# Patient Record
Sex: FEMALE | Race: WHITE | NOT HISPANIC OR LATINO | Employment: OTHER | URBAN - METROPOLITAN AREA
[De-identification: names, ages, dates, MRNs, and addresses within clinical notes are randomized per-mention and may not be internally consistent; named-entity substitution may affect disease eponyms.]

---

## 2017-04-10 ENCOUNTER — TRANSCRIBE ORDERS (OUTPATIENT)
Dept: ADMINISTRATIVE | Facility: HOSPITAL | Age: 72
End: 2017-04-10

## 2017-04-10 DIAGNOSIS — Z12.31 ENCOUNTER FOR SCREENING MAMMOGRAM FOR BREAST CANCER: Primary | ICD-10-CM

## 2017-04-11 ENCOUNTER — HOSPITAL ENCOUNTER (OUTPATIENT)
Dept: MAMMOGRAPHY | Facility: HOSPITAL | Age: 72
Discharge: HOME | End: 2017-04-11
Attending: INTERNAL MEDICINE | Admitting: INTERNAL MEDICINE

## 2017-04-11 DIAGNOSIS — Z12.31 ENCOUNTER FOR SCREENING MAMMOGRAM FOR BREAST CANCER: ICD-10-CM

## 2017-04-11 PROCEDURE — G0202 SCR MAMMO BI INCL CAD: HCPCS

## 2017-04-11 PROCEDURE — 77063 BREAST TOMOSYNTHESIS BI: CPT

## 2017-04-17 ENCOUNTER — TRANSCRIBE ORDERS (OUTPATIENT)
Dept: ADMINISTRATIVE | Facility: HOSPITAL | Age: 72
End: 2017-04-17

## 2017-04-17 DIAGNOSIS — C80.1 MALIGNANT NEOPLASM (HCC): Primary | ICD-10-CM

## 2018-04-12 ENCOUNTER — HOSPITAL ENCOUNTER (OUTPATIENT)
Dept: MAMMOGRAPHY | Facility: HOSPITAL | Age: 73
Discharge: HOME | End: 2018-04-12
Attending: INTERNAL MEDICINE | Admitting: INTERNAL MEDICINE

## 2018-04-12 DIAGNOSIS — C80.1 MALIGNANT NEOPLASM (HCC): ICD-10-CM

## 2018-04-12 PROCEDURE — 77063 BREAST TOMOSYNTHESIS BI: CPT

## 2018-04-12 PROCEDURE — 77067 SCR MAMMO BI INCL CAD: CPT

## 2019-03-05 ENCOUNTER — TRANSCRIBE ORDERS (OUTPATIENT)
Dept: ADMINISTRATIVE | Facility: HOSPITAL | Age: 74
End: 2019-03-05

## 2019-03-05 DIAGNOSIS — Z12.39 BREAST SCREENING: Primary | ICD-10-CM

## 2019-04-15 ENCOUNTER — HOSPITAL ENCOUNTER (OUTPATIENT)
Dept: MAMMOGRAPHY | Facility: HOSPITAL | Age: 74
Discharge: HOME | End: 2019-04-15
Admitting: INTERNAL MEDICINE

## 2019-04-15 DIAGNOSIS — Z12.39 BREAST SCREENING: ICD-10-CM

## 2019-04-15 PROCEDURE — 77067 SCR MAMMO BI INCL CAD: CPT

## 2019-04-15 PROCEDURE — 77063 BREAST TOMOSYNTHESIS BI: CPT

## 2019-04-22 ENCOUNTER — TRANSCRIBE ORDERS (OUTPATIENT)
Dept: ADMINISTRATIVE | Facility: HOSPITAL | Age: 74
End: 2019-04-22

## 2019-04-22 DIAGNOSIS — Z12.39 SCREENING BREAST EXAMINATION: ICD-10-CM

## 2019-04-22 DIAGNOSIS — Z79.810 LONG TERM (CURRENT) USE OF SELECTIVE ESTROGEN RECEPTOR MODULATORS (SERMS): Primary | ICD-10-CM

## 2019-05-01 ENCOUNTER — HOSPITAL ENCOUNTER (OUTPATIENT)
Dept: PHYSICAL THERAPY | Facility: HOSPITAL | Age: 74
Setting detail: THERAPIES SERIES
Discharge: HOME | End: 2019-05-01

## 2019-05-01 DIAGNOSIS — M47.27 LUMBOSACRAL SPONDYLOSIS WITH RADICULOPATHY: Primary | ICD-10-CM

## 2019-05-01 PROCEDURE — 97161 PT EVAL LOW COMPLEX 20 MIN: CPT | Performed by: PHYSICAL THERAPIST

## 2019-05-01 PROCEDURE — 97110 THERAPEUTIC EXERCISES: CPT | Performed by: PHYSICAL THERAPIST

## 2019-05-01 NOTE — THERAPY EVALUATION
Outpatient Physical Therapy Ortho Initial Evaluation  [facility]     Patient Name: Codi Castillo  : 1945  MRN: [id number]  Today's Date: 2019      Visit Date: 2019    There is no problem list on file for this patient.       Past Medical History:   Diagnosis Date   • Breast cancer (CMS/[facility]) 2010    right mastectomy        Past Surgical History:   Procedure Laterality Date   • HYSTERECTOMY     • MASTECTOMY         Visit Dx:     ICD-10-CM ICD-9-CM   1. Lumbosacral spondylosis with radiculopathy M47.27 721.3     724.4         Patient History     Row Name 19 0800             History    Chief Complaint  Pain;Difficulty with daily activities  -SP      Type of Pain  Back pain  -SP      Date Current Problem(s) Began  19  -SP      Brief Description of Current Complaint  Patient reports insidious onset low back pain that began several months ago.  She has had 2 injections, she had two weeks of relief following inital injection but no relief following last injection.  Patient reports that she had prior treatment including PT at [facility] during winter.  She reports that she had increased pain and symptoms with therapy and it did not help.  She states she had been doing exercise only and all symptoms worsened.  Patient states she is not taking any meds for her back.  Patient reports that she is having symptoms into right LE that are above knee, most noticeable with walking or standing.  She has decreased pain with sitting or lying.    -SP      Previous treatment for THIS PROBLEM  Injections;Rehabilitation  -SP      Patient/Caregiver Goals  Relieve pain  -SP      Current Tobacco Use  no  -SP      Patient's Rating of General Health  Good  -SP      Occupation/sports/leisure activities  enjoys sewing, stands to do so  -SP      How has patient tried to help current problem?  sit, rest  -SP      What clinical tests have you had for this problem?  MRI  -SP      Results of Clinical Tests   patient does not know results; results not available on EPIC  -SP         Pain     Pain Location  Back;Leg  -SP      Pain at Present  0  -SP      Pain at Best  0  -SP      Pain at Worst  10  -SP      Pain Frequency  Intermittent  -SP      Pain Description  Sharp  -SP      What Performance Factors Make the Current Problem(s) WORSE?  standing walking  -SP      What Performance Factors Make the Current Problem(s) BETTER?  sitting, lying  -SP      Tolerance Time- Standing  10 min  -SP      Tolerance Time- Sitting  unlimited  -SP      Tolerance Time- Walking  10 min  -SP      Is your sleep disturbed?  No  -SP      Difficulties at work?  retired  -SP      Difficulties with ADL's?  difficulty tolerating going to grocery, cooking, cleaning  -SP         Fall Risk Assessment    Any falls in the past year:  No  -SP         Daily Activities    Primary Language  English  -SP      Are you able to read  Yes  -SP      Are you able to write  Yes  -SP      How does patient learn best?  Listening;Reading  -SP      Teaching needs identified  Home Exercise Program;Management of Condition  -SP      Does patient have problems with the following?  None  -SP      Barriers to learning  None  -SP      Pt Participated in POC and Goals  Yes  -SP         Safety    Are you being hurt, hit, or frightened by anyone at home or in your life?  No  -SP      Are you being neglected by a caregiver  No  -SP        User Key  (r) = Recorded By, (t) = Taken By, (c) = Cosigned By    Initials Name Provider Type    SP Andria Benavides, PT Physical Therapist          PT Ortho     Row Name 05/01/19 0900       Posture/Observations    Thoracic Kyphosis  Mild right thoracic shift  -SP    Lumbar lordosis  Decreased  -SP    Iliac crests  Bilateral:;Normal  -SP       Neural Tension Signs- Lower Quarter Clearing    SLR  Bilateral:;Negative  -SP       Sensory Screen for Light Touch- Lower Quarter Clearing    L1 (inguinal area)  Bilateral:;Intact  -SP    L2  (anterior mid thigh)  Bilateral:;Intact  -SP    L3 (distal anterior thigh)  Bilateral:;Intact  -SP    L4 (medial lower leg/foot)  Bilateral:;Intact  -SP    L5 (lateral lower leg/great toe)  Bilateral:;Intact  -SP    S1 (bottom of foot)  Bilateral:;Intact  -SP       Myotomal Screen- Lower Quarter Clearing    Hip flexion (L2)  Bilateral:;4+ (Good +)  -SP    Knee extension (L3)  Bilateral:;4+ (Good +)  -SP    Ankle DF (L4)  Bilateral:;4- (Good -)  -SP    Great toe extension (L5)  Bilateral:;4- (Good -)  -SP    Ankle PF (S1)  Bilateral:;4- (Good -)  -SP    Knee flexion (S2)  Bilateral:;4 (Good)  -SP       Lumbar/SI Special Tests    Standing Flexion Test (SI Dysfunction)  Bilateral:;Negative  -SP    Stork Test (SI Dysfunction)  Bilateral:;Negative  -SP    SLR (Neural Tension)  Bilateral:;Negative  -SP    NITIN (hip vs. SI Dysfunction)  Right:;Positive  -SP       Lumbosacral Palpation    SI  Bilateral:;Tender  -SP    Lumbosacral Segment  Bilateral:;Tender;Guarded/taut  -SP    Erector Spinae (Paraspinals)  Bilateral:;Tender;Guarded/taut  -SP       General ROM    GENERAL ROM COMMENTS  (B) LE AROM wfl  -SP       Head/Neck/Trunk    Trunk Extension AROM  unable to extend  -SP    Trunk Flexion AROM  decreased by 50% from full range with pain upon return to stand  -SP    Trunk Lt Lateral Flexion AROM  decreased by 75%  -SP    Trunk Rt Lateral Flexion AROM  decreased by 50% from full range with mild complaints of pain  -SP    Trunk Lt Rotation AROM  decreased by 50% from full range  -SP    Trunk Rt Rotation AROM  decreased by 50% from full range  -SP       MMT Neck/Trunk    Trunk Flexion MMT, Gross Movement  (2/5) poor  -SP    Left Pelvic Elevation MMT, Gross Movement  (2/5) poor  -SP    Right Pelvic Elevation MMT, Gross Movement:  (2/5) poor  -SP    Neck/Trunk Comments   able to perform pelvic tilt but unable to maintain with hip flexion  -SP       Sensation    Sensation WNL?  WFL  -SP       Lower Extremity Flexibility     Hamstrings  Bilateral:;Moderately limited  -SP    Hip External Rotators  Bilateral:;Moderately limited  -SP    Hip Internal Rotators  Bilateral:;Moderately limited  -SP       Transfers    Sit-Stand Castile (Transfers)  independent  -SP    Stand-Sit Castile (Transfers)  independent  -SP    Comment (Transfers)  patient uses UE to assist with transfers sit to stand; independent with transfers sup/sit but with difficulty and needs cues to perform with appropriate technique  -SP       Gait/Stairs Assessment/Training    Castile Level (Gait)  independent  -SP    Pattern (Gait)  swing-through  -SP    Deviations/Abnormal Patterns (Gait)  gait speed decreased;stride length decreased  -SP    Bilateral Gait Deviations  forward flexed posture  -SP      User Key  (r) = Recorded By, (t) = Taken By, (c) = Cosigned By    Initials Name Provider Type    Andria Matt, PT Physical Therapist                      Therapy Education  Given: HEP  Program: New  How Provided: Verbal, Written  Provided to: Patient  Level of Understanding: Verbalized, Demonstrated     PT OP Goals     Row Name 05/01/19 0900          PT Short Term Goals    STG Date to Achieve  05/31/19  -SP     STG 1  Patient instructed in and demonstrates appropriate technique with transfers sup/sit  -SP     STG 2  Patient transfers sit to stand with decreased use of UEs to assist  -SP     STG 3  Patient reports decreased radicular type symptoms into right LE by 25%  -SP        Long Term Goals    LTG 1  Patient reports improved ability to stand and walk >30 min with pain level <2/10 at worst  -SP     LTG 2  Patient demonstrates increased trunk strength by one muscle grade  -SP     LTG 3  Patient independent wtih HEP  -SP        Time Calculation    PT Goal Re-Cert Due Date  05/31/19  -SP       User Key  (r) = Recorded By, (t) = Taken By, (c) = Cosigned By    Initials Name Provider Type    Andria Matt, PT Physical Therapist          PT  Assessment/Plan     Row Name 05/01/19 1032          PT Assessment    Functional Limitations  Impaired gait;Limitation in home management;Limitations in community activities;Performance in self-care ADL;Performance in leisure activities;Limitations in functional capacity and performance  -SP     Impairments  Endurance;Range of motion;Posture;Poor body mechanics;Pain;Muscle strength;Impaired flexibility  -SP     Assessment Comments  Patient with several month insidious onset of low back pain with right LE radicular type symptoms.  She presents with pain, decreased trunk ROM, decreased strength and difficulty tolerating home and community weight bearing activity  -SP     Please refer to paper survey for additional self-reported information  Yes  -SP     Rehab Potential  Good  -SP     Patient/caregiver participated in establishment of treatment plan and goals  Yes  -SP     Patient would benefit from skilled therapy intervention  Yes  -SP        PT Plan    PT Frequency  2x/week  -SP     Predicted Duration of Therapy Intervention (Therapy Eval)  4 weeks  -SP     Planned CPT's?  PT EVAL LOW COMPLEXITY: 86513;PT MANUAL THERAPY EA 15 MIN: 50247;PT HOT OR COLD PACK TREAT MCARE;PT ULTRASOUND EA 15 MIN: 93742;PT ELECTRICAL STIM UNATTEND: ;PT THER PROC EA 15 MIN: 25346  -SP       User Key  (r) = Recorded By, (t) = Taken By, (c) = Cosigned By    Initials Name Provider Type    Andria Matt, PT Physical Therapist          Modalities     Row Name 05/01/19 0900             Moist Heat    MH Applied  Yes  -SP      Location  L/S area  -SP      Rx Minutes  12 mins  -SP      MH Prior to Rx  Yes  -SP        User Key  (r) = Recorded By, (t) = Taken By, (c) = Cosigned By    Initials Name Provider Type    Andria Matt, PT Physical Therapist        Exercises     Row Name 05/01/19 0900             Exercise 1    Exercise Name 1  SKTC  -SP      Reps 1  3  -SP      Time 1  20 sec  -SP         Exercise 2     Exercise Name 2  piriformis stretch  -SP      Reps 2  3  -SP      Time 2  20 sec  -SP         Exercise 3    Exercise Name 3  hamstring stretch  -SP      Reps 3  3  -SP      Time 3  20 sec  -SP         Exercise 4    Exercise Name 4  PPT  -SP      Reps 4  10  -SP      Time 4  5 sec  -SP        User Key  (r) = Recorded By, (t) = Taken By, (c) = Cosigned By    Initials Name Provider Type    SP Andria Benavides, PT Physical Therapist                        Outcome Measure Options: Other Outcome Measure  Other Outcome Measure Tool Used  Other Outcome Measure Tool Comments: Back index score 44      Time Calculation:     Start Time: 0845  Stop Time: 0955  Time Calculation (min): 70 min     Therapy Charges for Today     Code Description Service Date Service Provider Modifiers Qty    18403240939 HC PT THER PROC EA 15 MIN 5/1/2019 Andria Benavides, PT GP 1    75162215861 HC PT EVAL LOW COMPLEXITY 3 5/1/2019 Andria Benavides, PT GP 1          PT G-Codes  Outcome Measure Options: Other Outcome Measure         Andria Benavides, PT  5/1/2019

## 2019-05-07 ENCOUNTER — HOSPITAL ENCOUNTER (OUTPATIENT)
Dept: PHYSICAL THERAPY | Facility: HOSPITAL | Age: 74
Setting detail: THERAPIES SERIES
Discharge: HOME | End: 2019-05-07

## 2019-05-07 DIAGNOSIS — M47.27 LUMBOSACRAL SPONDYLOSIS WITH RADICULOPATHY: Primary | ICD-10-CM

## 2019-05-07 PROCEDURE — G0283 ELEC STIM OTHER THAN WOUND: HCPCS | Performed by: PHYSICAL THERAPIST

## 2019-05-07 PROCEDURE — 97110 THERAPEUTIC EXERCISES: CPT | Performed by: PHYSICAL THERAPIST

## 2019-05-07 NOTE — THERAPY TREATMENT NOTE
Outpatient Physical Therapy Ortho Treatment Note  [facility]     Patient Name: Codi Castillo  : 1945  MRN: [id number]  Today's Date: 2019      Visit Date: 2019    Visit Dx:    ICD-10-CM ICD-9-CM   1. Lumbosacral spondylosis with radiculopathy M47.27 721.3     724.4       There is no problem list on file for this patient.       Past Medical History:   Diagnosis Date   • Breast cancer (CMS/[facility]) 2010    right mastectomy        Past Surgical History:   Procedure Laterality Date   • HYSTERECTOMY     • MASTECTOMY         PT Ortho     Row Name 19 07       Subjective Comments    Subjective Comments  Patient reports that she has had increased pain since eval.  She reports that she has been doing stretches at home and they are increasing her pain.  She reports that she continues to have pain in right L/S area and   -SP      User Key  (r) = Recorded By, (t) = Taken By, (c) = Cosigned By    Initials Name Provider Type    Andria Matt, PT Physical Therapist                      PT Assessment/Plan     Row Name 19 0910          PT Assessment    Assessment Comments  Patient is reporting increased pain with gentle traction and PPT.  She reports that she has been consistent with stretches at home despite the increased pain.  Addition of IFC applied after Rx today  -SP        PT Plan    PT Plan Comments  Assess response to IFC.  Try to progress if tolerable.  Will continue to try PT 1-2 weeks, however if patient unable to tolerate or progress may need referral back to MD  -SP       User Key  (r) = Recorded By, (t) = Taken By, (c) = Cosigned By    Initials Name Provider Type    Andria Matt, PT Physical Therapist          Modalities     Row Name 19 07             Moist Heat    MH Applied  Yes MHP before rx: MHP w/ IFC after  -SP      Location  L/S area  -SP      Rx Minutes  12 mins  -SP      MH Prior to Rx  Yes  -SP         ELECTRICAL STIMULATION     Attended/Unattended  Unattended  -SP      Stimulation Type  IFC  -SP      Location/Electrode Placement/Other  (B) L/S area  -SP        User Key  (r) = Recorded By, (t) = Taken By, (c) = Cosigned By    Initials Name Provider Type    Andria Matt PT Physical Therapist        Exercises     Row Name 05/07/19 0700             Subjective Comments    Subjective Comments  Patient reports that she has had increased pain since eval.  She reports that she has been doing stretches at home and they are increasing her pain.  She reports that she continues to have pain in right L/S area and   -SP         Exercise 1    Exercise Name 1  SKTC  -SP      Reps 1  3  -SP      Time 1  20 sec  -SP         Exercise 2    Exercise Name 2  piriformis stretch  -SP      Reps 2  3  -SP      Time 2  20 sec  -SP         Exercise 3    Exercise Name 3  hamstring stretch  -SP      Reps 3  3  -SP      Time 3  20 sec  -SP         Exercise 4    Exercise Name 4  PPT  -SP      Reps 4  10  -SP      Time 4  5 sec  -SP         Exercise 5    Exercise Name 5  PPT with ball squeeze  -SP      Reps 5  10  -SP      Time 5  5 sec  -SP        User Key  (r) = Recorded By, (t) = Taken By, (c) = Cosigned By    Initials Name Provider Type    Andria Matt, WALKER Physical Therapist                           Therapy Education  Education Details: Reviewed transfer technique with supine/sit  Given: HEP  Program: Reinforced  How Provided: Verbal  Provided to: Patient  Level of Understanding: Verbalized, Demonstrated              Time Calculation:   Start Time: 0750  Stop Time: 0905  Time Calculation (min): 75 min  Therapy Charges for Today     Code Description Service Date Service Provider Modifiers Qty    22916124146 HC PT ELECTRICAL STIM UNATTENDED 5/7/2019 Andria Benavides, PT  1    11686969221 HC PT THER PROC EA 15 MIN 5/7/2019 Andria Benavides, PT GP 1                    Andria Benavides PT  5/7/2019

## 2019-05-10 ENCOUNTER — APPOINTMENT (OUTPATIENT)
Dept: PHYSICAL THERAPY | Facility: HOSPITAL | Age: 74
End: 2019-05-10

## 2020-03-23 ENCOUNTER — TRANSCRIBE ORDERS (OUTPATIENT)
Dept: ADMINISTRATIVE | Facility: HOSPITAL | Age: 75
End: 2020-03-23

## 2020-03-23 DIAGNOSIS — Z78.0 POST-MENOPAUSAL: Primary | ICD-10-CM

## 2020-04-24 ENCOUNTER — TRANSCRIBE ORDERS (OUTPATIENT)
Dept: ADMINISTRATIVE | Facility: HOSPITAL | Age: 75
End: 2020-04-24

## 2020-04-24 DIAGNOSIS — Z12.39 BREAST CANCER SCREENING: Primary | ICD-10-CM

## 2020-04-24 DIAGNOSIS — Z78.0 POST-MENOPAUSAL: ICD-10-CM

## 2020-05-12 ENCOUNTER — HOSPITAL ENCOUNTER (OUTPATIENT)
Dept: MAMMOGRAPHY | Facility: HOSPITAL | Age: 75
Discharge: HOME | End: 2020-05-12
Admitting: NURSE PRACTITIONER

## 2020-05-12 ENCOUNTER — APPOINTMENT (OUTPATIENT)
Dept: BONE DENSITY | Facility: HOSPITAL | Age: 75
End: 2020-05-12

## 2020-05-12 DIAGNOSIS — Z78.0 POST-MENOPAUSAL: ICD-10-CM

## 2020-05-12 DIAGNOSIS — Z12.39 BREAST CANCER SCREENING: ICD-10-CM

## 2020-05-12 PROCEDURE — 77063 BREAST TOMOSYNTHESIS BI: CPT

## 2020-05-12 PROCEDURE — 77067 SCR MAMMO BI INCL CAD: CPT

## 2020-05-12 PROCEDURE — 77080 DXA BONE DENSITY AXIAL: CPT

## 2020-05-18 ENCOUNTER — TRANSCRIBE ORDERS (OUTPATIENT)
Dept: ADMINISTRATIVE | Facility: HOSPITAL | Age: 75
End: 2020-05-18

## 2020-05-18 DIAGNOSIS — Z92.89 HISTORY OF MAMMOGRAPHY, SCREENING: Primary | ICD-10-CM

## 2021-05-18 ENCOUNTER — HOSPITAL ENCOUNTER (OUTPATIENT)
Dept: MAMMOGRAPHY | Facility: HOSPITAL | Age: 76
Discharge: HOME | End: 2021-05-18
Admitting: INTERNAL MEDICINE

## 2021-05-18 DIAGNOSIS — Z92.89 HISTORY OF MAMMOGRAPHY, SCREENING: ICD-10-CM

## 2021-05-18 PROCEDURE — 77067 SCR MAMMO BI INCL CAD: CPT

## 2021-05-18 PROCEDURE — 77063 BREAST TOMOSYNTHESIS BI: CPT

## 2023-01-04 ENCOUNTER — TELEPHONE (OUTPATIENT)
Dept: NEUROLOGY | Facility: CLINIC | Age: 78
End: 2023-01-04
Payer: MEDICARE

## 2023-01-04 NOTE — TELEPHONE ENCOUNTER
Spoke with patient. She is having her next B12 on 1/9/23. I informed patient we should be able to do her B12 when she comes in for her appt in February as long as it is 30 days after her last injection.

## 2023-01-04 NOTE — TELEPHONE ENCOUNTER
Caller: Codi Castillo    Relationship: Self    Best call back number: [phone]    What was the call regarding: PT CALLED STATING SHE IS GETTING HER MONTHLY VITAMIN B12 INJECTION AT [facility] IN A WEEK OR SO. PT ASKS IF WHEN SHE COMES IN FOR HER VISIT W/ DR. FLORES ON 2/13/23, WILL SHE RECEIVE HER MONTHLY B12 INJECTION?    Do you require a callback: YES, PLEASE.    PLEASE REVIEW AND ADVISE.

## 2023-02-13 ENCOUNTER — OFFICE VISIT (OUTPATIENT)
Dept: NEUROLOGY | Facility: CLINIC | Age: 78
End: 2023-02-13
Payer: MEDICARE

## 2023-02-13 VITALS
WEIGHT: 207.8 LBS | OXYGEN SATURATION: 96 % | DIASTOLIC BLOOD PRESSURE: 82 MMHG | SYSTOLIC BLOOD PRESSURE: 138 MMHG | HEART RATE: 67 BPM

## 2023-02-13 DIAGNOSIS — R41.3 MEMORY LOSS: ICD-10-CM

## 2023-02-13 DIAGNOSIS — E53.8 B12 DEFICIENCY: Primary | ICD-10-CM

## 2023-02-13 DIAGNOSIS — G25.0 ESSENTIAL TREMOR: ICD-10-CM

## 2023-02-13 DIAGNOSIS — G62.9 POLYNEUROPATHY: ICD-10-CM

## 2023-02-13 PROCEDURE — 96372 THER/PROPH/DIAG INJ SC/IM: CPT | Performed by: PSYCHIATRY & NEUROLOGY

## 2023-02-13 PROCEDURE — 99214 OFFICE O/P EST MOD 30 MIN: CPT | Performed by: PSYCHIATRY & NEUROLOGY

## 2023-02-13 RX ORDER — ADALIMUMAB 40MG/0.8ML
40 KIT SUBCUTANEOUS
COMMUNITY
Start: 2023-02-02

## 2023-02-13 RX ORDER — PROPRANOLOL HYDROCHLORIDE 80 MG/1
80 CAPSULE, EXTENDED RELEASE ORAL DAILY
COMMUNITY
Start: 2023-02-03

## 2023-02-13 RX ORDER — B-COMPLEX WITH VITAMIN C
1 TABLET ORAL DAILY
COMMUNITY

## 2023-02-13 RX ORDER — CYANOCOBALAMIN 1000 UG/ML
1000 INJECTION, SOLUTION INTRAMUSCULAR; SUBCUTANEOUS
COMMUNITY

## 2023-02-13 RX ORDER — RIZATRIPTAN BENZOATE 10 MG/1
10 TABLET ORAL ONCE AS NEEDED
COMMUNITY

## 2023-02-13 RX ORDER — OMEPRAZOLE 40 MG/1
40 CAPSULE, DELAYED RELEASE ORAL DAILY
COMMUNITY

## 2023-02-13 RX ORDER — GABAPENTIN 300 MG/1
300 CAPSULE ORAL 3 TIMES DAILY
COMMUNITY
Start: 2022-11-06

## 2023-02-13 RX ORDER — CYANOCOBALAMIN 1000 UG/ML
1000 INJECTION, SOLUTION INTRAMUSCULAR; SUBCUTANEOUS
Status: SHIPPED | OUTPATIENT
Start: 2023-02-13

## 2023-02-13 RX ORDER — MECLIZINE HYDROCHLORIDE 25 MG/1
25 TABLET ORAL AS NEEDED
COMMUNITY
Start: 2022-11-17

## 2023-02-13 RX ORDER — DOCUSATE SODIUM 100 MG/1
100 CAPSULE, LIQUID FILLED ORAL 2 TIMES DAILY
COMMUNITY

## 2023-02-13 RX ORDER — DULAGLUTIDE 0.75 MG/.5ML
INJECTION, SOLUTION SUBCUTANEOUS WEEKLY
COMMUNITY

## 2023-02-13 RX ADMIN — CYANOCOBALAMIN 1000 MCG: 1000 INJECTION, SOLUTION INTRAMUSCULAR; SUBCUTANEOUS at 10:11

## 2023-02-13 NOTE — PROGRESS NOTES
Subjective:     Patient ID: Codi Castillo is a 77 y.o. female.    History of Present Illness  The following portions of the patient's history were reviewed and updated as appropriate: allergies, current medications, past family history, past medical history, past social history, past surgical history and problem list.    Review of Systems   Constitutional: Negative for activity change, appetite change and fatigue.   HENT: Negative for facial swelling, trouble swallowing and voice change.    Eyes: Negative for photophobia, pain and visual disturbance.   Respiratory: Negative for chest tightness, shortness of breath and wheezing.    Cardiovascular: Negative for chest pain, palpitations and leg swelling.   Gastrointestinal: Negative for abdominal pain, nausea and vomiting.   Endocrine: Negative for cold intolerance and heat intolerance.   Musculoskeletal: Positive for arthralgias, back pain and myalgias. Negative for gait problem, joint swelling, neck pain and neck stiffness.   Neurological: Positive for tremors (hands at times). Negative for dizziness, seizures, syncope, facial asymmetry, speech difficulty, weakness, light-headedness, numbness and headaches.   Hematological: Does not bruise/bleed easily.   Psychiatric/Behavioral: Negative for agitation, behavioral problems, confusion, decreased concentration, dysphoric mood, hallucinations, self-injury, sleep disturbance and suicidal ideas. The patient is not nervous/anxious and is not hyperactive.         Objective:    Neurologic Exam  Awake alert pleasant cooperative oriented in all spheres.  Normal speech and language function.    Cranial nerves II through XII normal and symmetric.  No head tremor.  No lateralization.    Motor exam reveals reasonable tone bulk and power symmetrically for age and activity level.  No resting tremor.  No postural tremor.  No intention tremor.    The sensory exam reveals a length dependent decrease in temperature and vibration in the  lower extremities symmetrically.    Tendon reflexes are 1+ and symmetric throughout in the arms, absent at the knees and absent at the ankles.  Physical Exam    Assessment/Plan:     Diagnoses and all orders for this visit:    1. B12 deficiency (Primary)  -     cyanocobalamin injection 1,000 mcg    2. Memory loss    3. Essential tremor    4. Polyneuropathy     Doing very well.  Remains cognitively preserved as long as she is taking her vitamin B12 replacement injections which we will continue today.  Given the drive, I have encouraged her to explore getting her monthly B12 injections at Dr. Villagran's office.  Tremors under good control with beta-blockers and will continue her Inderal LA 80 mg daily unchanged.  Polyneuropathy is stable.  Continues on gabapentin.  No changes are indicated.  We will see her back in 1 year, earlier if necessary.

## 2023-05-16 RX ORDER — GABAPENTIN 300 MG/1
300 CAPSULE ORAL 3 TIMES DAILY
Qty: 270 CAPSULE | Refills: 0 | Status: SHIPPED | OUTPATIENT
Start: 2023-05-16

## 2023-05-16 NOTE — TELEPHONE ENCOUNTER
23}    Medication requested (name and dose):     GABAPENTIN 300 MG CAPSULE    Pharmacy where request should be sent:     [facility]  [address] [place], [place]    PH: [phone]  FX: [phone]      Additional details provided by patient:     Best call back number: [phone]    Does the patient have less than a 3 day supply:  [] Yes  [] No    Alfredo Arias Rep  05/16/23, 09:47 [id number]}

## 2023-10-14 DIAGNOSIS — G62.9 POLYNEUROPATHY: Primary | ICD-10-CM

## 2023-10-16 RX ORDER — GABAPENTIN 300 MG/1
300 CAPSULE ORAL 3 TIMES DAILY
Qty: 270 CAPSULE | Refills: 0 | Status: SHIPPED | OUTPATIENT
Start: 2023-10-16

## 2023-11-20 DIAGNOSIS — G25.0 ESSENTIAL TREMOR: Primary | ICD-10-CM

## 2023-11-20 RX ORDER — PROPRANOLOL HYDROCHLORIDE 80 MG/1
80 CAPSULE, EXTENDED RELEASE ORAL DAILY
Qty: 90 CAPSULE | Refills: 3 | Status: SHIPPED | OUTPATIENT
Start: 2023-11-20

## 2023-11-20 NOTE — TELEPHONE ENCOUNTER
Caller: ANNIKA Lassiter call back number: [phone]     Requested Prescriptions: PROPRANOLOL LA 80 MG 24 HOUR   Requested Prescriptions      No prescriptions requested or ordered in this encounter        Pharmacy where request should be sent:      [facility] - [address] - [phone]  - [phone]  [phone]       Last office visit with prescribing clinician: 2/13/2023   Last telemedicine visit with prescribing clinician: Visit date not found   Next office visit with prescribing clinician: 2/19/2024     Additional details provided by patient: THIS IS MAIL ORDER AND PT ONLY HAS 6 DAYS LEFT , NEED SEND ASAP     Does the patient have less than a 3 day supply:  [] Yes  [] No    Would you like a call back once the refill request has been completed: [] Yes [] No    If the office needs to give you a call back, can they leave a voicemail: [] Yes [] No    Alfredo Saini Rep   11/20/23 11:05 EST

## 2024-02-19 ENCOUNTER — OFFICE VISIT (OUTPATIENT)
Dept: NEUROLOGY | Facility: CLINIC | Age: 79
End: 2024-02-19
Payer: MEDICARE

## 2024-02-19 ENCOUNTER — TELEPHONE (OUTPATIENT)
Dept: NEUROLOGY | Facility: CLINIC | Age: 79
End: 2024-02-19
Payer: MEDICARE

## 2024-02-19 VITALS
HEIGHT: 66 IN | DIASTOLIC BLOOD PRESSURE: 82 MMHG | HEART RATE: 75 BPM | SYSTOLIC BLOOD PRESSURE: 126 MMHG | WEIGHT: 196 LBS | BODY MASS INDEX: 31.5 KG/M2 | OXYGEN SATURATION: 97 %

## 2024-02-19 DIAGNOSIS — G62.9 POLYNEUROPATHY: ICD-10-CM

## 2024-02-19 DIAGNOSIS — H81.10 BENIGN PAROXYSMAL POSITIONAL VERTIGO, UNSPECIFIED LATERALITY: Primary | ICD-10-CM

## 2024-02-19 DIAGNOSIS — G25.0 ESSENTIAL TREMOR: ICD-10-CM

## 2024-02-19 DIAGNOSIS — E53.8 B12 DEFICIENCY: ICD-10-CM

## 2024-02-19 DIAGNOSIS — R41.3 MEMORY LOSS: ICD-10-CM

## 2024-02-19 PROCEDURE — 1160F RVW MEDS BY RX/DR IN RCRD: CPT | Performed by: PSYCHIATRY & NEUROLOGY

## 2024-02-19 PROCEDURE — 99214 OFFICE O/P EST MOD 30 MIN: CPT | Performed by: PSYCHIATRY & NEUROLOGY

## 2024-02-19 PROCEDURE — 1159F MED LIST DOCD IN RCRD: CPT | Performed by: PSYCHIATRY & NEUROLOGY

## 2024-02-19 RX ORDER — BUPROPION HYDROCHLORIDE 150 MG/1
TABLET, EXTENDED RELEASE ORAL
COMMUNITY
Start: 2024-01-03

## 2024-02-19 NOTE — PROGRESS NOTES
Notes by LPN:  Patient presents for 1 year neuropathy follow up. She is doing well.            Subjective:     Patient ID: Codi Castillo is a 78 y.o. female.    Peripheral Neuropathy  Associated symptoms include arthralgias, fatigue and myalgias. Pertinent negatives include no abdominal pain, chest pain, headaches, joint swelling, nausea, neck pain, numbness, vomiting or weakness.     The following portions of the patient's history were reviewed and updated as appropriate: allergies, current medications, past family history, past medical history, past social history, past surgical history, and problem list.    Review of Systems   Constitutional:  Positive for fatigue. Negative for activity change and appetite change.   HENT:  Negative for facial swelling, trouble swallowing and voice change.    Eyes:  Negative for photophobia, pain and visual disturbance.   Respiratory:  Negative for chest tightness, shortness of breath and wheezing.    Cardiovascular:  Negative for chest pain, palpitations and leg swelling.   Gastrointestinal:  Negative for abdominal pain, nausea and vomiting.   Endocrine: Negative for cold intolerance and heat intolerance.   Musculoskeletal:  Positive for arthralgias and myalgias. Negative for back pain, gait problem, joint swelling, neck pain and neck stiffness.   Neurological:  Negative for dizziness, tremors, seizures, syncope, facial asymmetry, speech difficulty, weakness, light-headedness, numbness and headaches.   Hematological:  Bruises/bleeds easily.   Psychiatric/Behavioral:  Negative for agitation, behavioral problems, confusion, decreased concentration, dysphoric mood, hallucinations, self-injury, sleep disturbance and suicidal ideas. The patient is not nervous/anxious and is not hyperactive.         Objective:    Neurological Exam  Awake alert pleasant cooperative oriented in all spheres.  Normal speech and language function.     Cranial nerves II through XII normal and symmetric.  No  head tremor.  No lateralization.     Motor exam reveals reasonable tone bulk and power symmetrically for age and activity level.  No resting tremor.  No postural tremor.  No intention tremor.     The sensory exam reveals a length dependent decrease in temperature and vibration in the lower extremities symmetrically.     Tendon reflexes are 1+ and symmetric throughout in the arms, absent at the knees and absent at the ankles.  Physical Exam    Assessment/Plan:     Diagnoses and all orders for this visit:    1. Benign paroxysmal positional vertigo, unspecified laterality (Primary)  -     Ambulatory Referral to Physical Therapy Vestibular    2. B12 deficiency    3. Memory loss    4. Essential tremor    5. Polyneuropathy       Prior history of memory loss is stabilized nicely with B12 replacement which she continues with Dr. Villagran.  Essential tremor is under good control with propranolol which we are continuing unchanged.  She does not have any new peripheral paresthesias or pain associated with her polyneuropathy and she continues on gabapentin unchanged.  She has a new symptom of intermittent vertigo, primarily when laying down in bed or rolling over in bed most likely representing benign paroxysmal positional vertigo.  I am referring her onto vestibular therapy for appropriate management and treatment and I discussed this diagnosis with her.    Will see her back in 1 year, earlier if necessary.

## 2024-02-19 NOTE — PROGRESS NOTES
Subjective:     Patient ID: Codi Castillo is a 78 y.o. female.    History of Present Illness  {Common ambulatory SmartLinks:[id number]}    Review of Systems     Objective:    Neurologic Exam    Physical Exam    Assessment/Plan:     {Assess/PlanSmartLinks:71654}

## 2024-02-19 NOTE — TELEPHONE ENCOUNTER
02/19/2024  I spoke to Vladislav LAWTON With [facility] medicare insurance , Mrs Castillo does have [employer]    REF# [id number]

## 2024-02-24 DIAGNOSIS — G62.9 POLYNEUROPATHY: ICD-10-CM

## 2024-02-26 RX ORDER — GABAPENTIN 300 MG/1
300 CAPSULE ORAL 3 TIMES DAILY
Qty: 270 CAPSULE | Refills: 0 | Status: SHIPPED | OUTPATIENT
Start: 2024-02-26

## 2024-07-13 DIAGNOSIS — G62.9 POLYNEUROPATHY: ICD-10-CM

## 2024-07-15 RX ORDER — GABAPENTIN 300 MG/1
300 CAPSULE ORAL 3 TIMES DAILY
Qty: 270 CAPSULE | Refills: 0 | Status: SHIPPED | OUTPATIENT
Start: 2024-07-15

## 2024-09-17 ENCOUNTER — TELEPHONE (OUTPATIENT)
Dept: NEUROLOGY | Facility: CLINIC | Age: 79
End: 2024-09-17
Payer: MEDICARE

## 2024-09-19 ENCOUNTER — SPECIALTY PHARMACY (OUTPATIENT)
Dept: INFUSION THERAPY | Facility: HOSPITAL | Age: 79
End: 2024-09-19
Payer: MEDICARE

## 2024-09-19 ENCOUNTER — OFFICE VISIT (OUTPATIENT)
Dept: NEUROLOGY | Facility: CLINIC | Age: 79
End: 2024-09-19
Payer: MEDICARE

## 2024-09-19 VITALS
OXYGEN SATURATION: 93 % | HEART RATE: 63 BPM | BODY MASS INDEX: 30.83 KG/M2 | SYSTOLIC BLOOD PRESSURE: 122 MMHG | WEIGHT: 191 LBS | DIASTOLIC BLOOD PRESSURE: 74 MMHG

## 2024-09-19 DIAGNOSIS — G43.001 MIGRAINE WITHOUT AURA AND WITH STATUS MIGRAINOSUS, NOT INTRACTABLE: ICD-10-CM

## 2024-09-19 DIAGNOSIS — G43.901 MIGRAINE WITH STATUS MIGRAINOSUS, NOT INTRACTABLE, UNSPECIFIED MIGRAINE TYPE: Primary | ICD-10-CM

## 2024-09-19 DIAGNOSIS — G25.0 ESSENTIAL TREMOR: ICD-10-CM

## 2024-09-19 DIAGNOSIS — E53.8 B12 DEFICIENCY: ICD-10-CM

## 2024-09-19 DIAGNOSIS — R41.3 AMNESIA: Primary | ICD-10-CM

## 2024-09-19 DIAGNOSIS — R41.3 MEMORY LOSS: ICD-10-CM

## 2024-09-19 RX ORDER — AZITHROMYCIN 250 MG/1
TABLET, FILM COATED ORAL
COMMUNITY
Start: 2024-09-17

## 2024-09-19 RX ORDER — ASPIRIN 81 MG/1
81 TABLET ORAL DAILY
COMMUNITY

## 2024-09-19 RX ORDER — INSULIN GLARGINE 300 U/ML
INJECTION, SOLUTION SUBCUTANEOUS
COMMUNITY
Start: 2024-08-21

## 2024-09-19 RX ORDER — RIMEGEPANT SULFATE 75 MG/75MG
75 TABLET, ORALLY DISINTEGRATING ORAL ONCE AS NEEDED
Qty: 8 TABLET | Refills: 5 | Status: SHIPPED | OUTPATIENT
Start: 2024-09-19

## 2024-09-20 ENCOUNTER — SPECIALTY PHARMACY (OUTPATIENT)
Dept: INFUSION THERAPY | Facility: HOSPITAL | Age: 79
End: 2024-09-20
Payer: MEDICARE

## 2024-09-25 ENCOUNTER — TELEPHONE (OUTPATIENT)
Dept: NEUROLOGY | Facility: CLINIC | Age: 79
End: 2024-09-25
Payer: MEDICARE

## 2024-10-01 ENCOUNTER — HOSPITAL ENCOUNTER (OUTPATIENT)
Dept: PULMONOLOGY | Facility: HOSPITAL | Age: 79
Discharge: HOME | End: 2024-10-01
Admitting: PSYCHIATRY & NEUROLOGY
Payer: MEDICARE

## 2024-10-01 DIAGNOSIS — R41.3 AMNESIA: ICD-10-CM

## 2024-10-01 PROCEDURE — 95812 EEG 41-60 MINUTES: CPT

## 2024-10-01 PROCEDURE — 95813 EEG EXTND MNTR 61-119 MIN: CPT | Performed by: PSYCHIATRY & NEUROLOGY

## 2024-10-25 ENCOUNTER — TELEPHONE (OUTPATIENT)
Dept: NEUROLOGY | Facility: CLINIC | Age: 79
End: 2024-10-25
Payer: MEDICARE

## 2024-10-25 DIAGNOSIS — G25.0 ESSENTIAL TREMOR: ICD-10-CM

## 2024-10-25 RX ORDER — PROPRANOLOL HYDROCHLORIDE 80 MG/1
80 CAPSULE, EXTENDED RELEASE ORAL DAILY
Qty: 90 CAPSULE | Refills: 3 | Status: SHIPPED | OUTPATIENT
Start: 2024-10-25

## 2024-10-25 NOTE — TELEPHONE ENCOUNTER
----- Message from Rolo Foster sent at 10/23/2024  4:28 PM EDT -----  EEG is normal.  ----- Message -----  From: Clive Lozada MD  Sent: 10/1/2024  11:35 AM EDT  To: Rolo Foster MD

## 2024-11-30 DIAGNOSIS — G62.9 POLYNEUROPATHY: ICD-10-CM

## 2024-12-02 RX ORDER — GABAPENTIN 300 MG/1
300 CAPSULE ORAL 3 TIMES DAILY
Qty: 270 CAPSULE | Refills: 0 | Status: SHIPPED | OUTPATIENT
Start: 2024-12-02

## 2024-12-20 ENCOUNTER — OFFICE VISIT (OUTPATIENT)
Dept: NEUROLOGY | Facility: CLINIC | Age: 79
End: 2024-12-20
Payer: MEDICARE

## 2024-12-20 VITALS
BODY MASS INDEX: 31.41 KG/M2 | SYSTOLIC BLOOD PRESSURE: 120 MMHG | OXYGEN SATURATION: 95 % | WEIGHT: 194.6 LBS | DIASTOLIC BLOOD PRESSURE: 78 MMHG | HEART RATE: 66 BPM

## 2024-12-20 DIAGNOSIS — R41.3 MEMORY LOSS: Primary | ICD-10-CM

## 2024-12-20 DIAGNOSIS — G25.0 ESSENTIAL TREMOR: ICD-10-CM

## 2024-12-20 DIAGNOSIS — G45.4 TRANSIENT GLOBAL AMNESIA: ICD-10-CM

## 2024-12-20 NOTE — PROGRESS NOTES
Notes by MA:  Ms Castillo is her today for a follow up appointment. Her sister accompanies her today. She states that she has not had any more amnesia episodes.      Subjective:     Patient ID: Codi Castillo is a 79 y.o. female.    Memory Loss    The following portions of the patient's history were reviewed and updated as appropriate: allergies, current medications, past family history, past medical history, past social history, past surgical history, and problem list.    Review of Systems   Psychiatric/Behavioral:  Positive for confusion and decreased concentration.         Objective:    Neurological Exam   Awake alert pleasant cooperative oriented in all spheres.  Normal speech and language function.     Cranial nerves II through XII normal and symmetric.  No head tremor.  No lateralization.     Motor exam reveals reasonable tone bulk and power symmetrically for age and activity level.  No resting tremor.  Very slight tremor with sustained posture.  No head tremor.     The sensory exam reveals a length dependent decrease in temperature and vibration in the lower extremities symmetrically.     Tendon reflexes are 1+ and symmetric throughout in the arms, absent at the knees and absent at the ankles.  Physical Exam    Assessment/Plan:     Diagnoses and all orders for this visit:    1. Memory loss (Primary)    2. Essential tremor    3. Transient global amnesia     No more episodes of global amnesia.  Her EEG, which completes her workup, was normal in the awake, drowsy, and asleep states.  Headaches have resolved.  She has not had to use the Nurtec provided our last visit.  Continuing to get her B12 replacement.  Doing well.  I reviewed all of her workup with her.  No changes on exam.  Will see her back in 1 year, earlier if necessary.

## 2024-12-28 PROBLEM — J06.9 VIRAL URI: Status: ACTIVE | Noted: 2024-12-28

## 2025-02-11 ENCOUNTER — SPECIALTY PHARMACY (OUTPATIENT)
Dept: INFUSION THERAPY | Facility: HOSPITAL | Age: 80
End: 2025-02-11
Payer: MEDICARE

## 2025-02-11 ENCOUNTER — TELEPHONE (OUTPATIENT)
Dept: INFUSION THERAPY | Facility: HOSPITAL | Age: 80
End: 2025-02-11
Payer: MEDICARE

## 2025-02-11 NOTE — TELEPHONE ENCOUNTER
I called the patient to see if she needed a refill of Nurtec. She reports that she has not had a headache since her initial episode and has not needed a single dose of Nurtec.  She still has 16 tablets on hand. Graduating the patient from our program.   Abdirahman Ruiz, [facility]  02/11/25  11:34 EST

## 2025-04-12 DIAGNOSIS — G62.9 POLYNEUROPATHY: ICD-10-CM

## 2025-04-14 RX ORDER — GABAPENTIN 300 MG/1
300 CAPSULE ORAL 3 TIMES DAILY
Qty: 270 CAPSULE | Refills: 1 | Status: SHIPPED | OUTPATIENT
Start: 2025-04-14

## 2025-06-12 ENCOUNTER — TELEPHONE (OUTPATIENT)
Dept: NEUROLOGY | Facility: CLINIC | Age: 80
End: 2025-06-12
Payer: MEDICARE

## 2025-06-12 NOTE — TELEPHONE ENCOUNTER
Called pt to remind her of appointment with Neurology on 6/13. There was no answer and VM is not set up.

## 2025-06-13 ENCOUNTER — OFFICE VISIT (OUTPATIENT)
Dept: NEUROLOGY | Facility: CLINIC | Age: 80
End: 2025-06-13
Payer: MEDICARE

## 2025-06-13 VITALS
DIASTOLIC BLOOD PRESSURE: 62 MMHG | WEIGHT: 195 LBS | HEIGHT: 66 IN | HEART RATE: 62 BPM | BODY MASS INDEX: 31.34 KG/M2 | SYSTOLIC BLOOD PRESSURE: 118 MMHG | OXYGEN SATURATION: 95 %

## 2025-06-13 DIAGNOSIS — G62.9 POLYNEUROPATHY: ICD-10-CM

## 2025-06-13 DIAGNOSIS — R41.3 MEMORY LOSS: ICD-10-CM

## 2025-06-13 DIAGNOSIS — R51.9 PRESSURE IN HEAD: ICD-10-CM

## 2025-06-13 DIAGNOSIS — R27.0 ATAXIA: Primary | ICD-10-CM

## 2025-06-13 PROCEDURE — 1159F MED LIST DOCD IN RCRD: CPT | Performed by: PSYCHIATRY & NEUROLOGY

## 2025-06-13 PROCEDURE — 1160F RVW MEDS BY RX/DR IN RCRD: CPT | Performed by: PSYCHIATRY & NEUROLOGY

## 2025-06-13 PROCEDURE — 99214 OFFICE O/P EST MOD 30 MIN: CPT | Performed by: PSYCHIATRY & NEUROLOGY

## 2025-06-13 RX ORDER — FUROSEMIDE 40 MG/1
40 TABLET ORAL DAILY
COMMUNITY
Start: 2025-04-22 | End: 2026-04-22

## 2025-06-13 RX ORDER — MODAFINIL 100 MG/1
100 TABLET ORAL DAILY
COMMUNITY
Start: 2025-04-22

## 2025-06-13 NOTE — PROGRESS NOTES
Notes by LPN:  Patient presents for 6 mo follow up. Patient reports she is having dizzy/room spinning sensation. She feels like she doesn't walk straight and feels like there is pressure on her head. She has a script for meclizine, but it knocks her out.             Subjective:     Patient ID: Codi Castillo is a 79 y.o. female.    History of Present Illness  The following portions of the patient's history were reviewed and updated as appropriate: allergies, past family history, past medical history, past social history, past surgical history, and problem list.    History of Present Illness  The patient is a 79-year-old woman here for follow-up of B12 deficiency and a new complaint of imbalance.    Review of Systems   Musculoskeletal:  Positive for gait problem.   Neurological:  Positive for light-headedness.        Objective:    Neurological Exam   Awake alert pleasant cooperative oriented in all spheres.  Normal speech and language function.     Cranial nerves II through XII normal and symmetric.  No head tremor.  No lateralization.     Motor exam reveals reasonable tone bulk and power symmetrically for age and activity level.  No resting tremor.  Very slight tremor with sustained posture.  No head tremor.     The sensory exam reveals a length dependent decrease in temperature and vibration in the lower extremities symmetrically.     Tendon reflexes are 1+ and symmetric throughout in the arms, absent at the knees and absent at the ankles.  Physical Exam    Assessment/Plan:          Assessment & Plan  Still doing well with her vitamin B12 injections.  No more episodes of transient global amnesia.  Her new complaint today of head pressure and imbalance when upright is her biggest issue.  I suspect that her imbalance is due to diabetic peripheral polyneuropathy.  However, the cyst does not explain the concomitant pressure and sometimes headache that she experiences and therefore we are moving ahead with that MRI of  the brain to exclude the unlikely possibility of cerebellar disease.  Talked about possibly undergoing vestibular therapy and physical therapy for balance but she has declined those options currently.  I will review her MRI when available take any further measures that may be necessary.    Patient or patient representative verbalized consent for the use of Ambient Listening during the visit with  Rolo Foster MD for chart documentation. 6/13/2025  11:31 EDT

## 2025-07-10 ENCOUNTER — HOSPITAL ENCOUNTER (OUTPATIENT)
Dept: MRI IMAGING | Facility: HOSPITAL | Age: 80
Discharge: HOME | End: 2025-07-10
Admitting: PSYCHIATRY & NEUROLOGY
Payer: MEDICARE

## 2025-07-10 DIAGNOSIS — R51.9 PRESSURE IN HEAD: ICD-10-CM

## 2025-07-10 DIAGNOSIS — R27.0 ATAXIA: ICD-10-CM

## 2025-07-10 PROCEDURE — 70551 MRI BRAIN STEM W/O DYE: CPT

## 2025-07-11 ENCOUNTER — RESULTS FOLLOW-UP (OUTPATIENT)
Dept: NEUROLOGY | Facility: CLINIC | Age: 80
End: 2025-07-11
Payer: MEDICARE